# Patient Record
Sex: MALE | Race: WHITE | NOT HISPANIC OR LATINO | Employment: FULL TIME | URBAN - METROPOLITAN AREA
[De-identification: names, ages, dates, MRNs, and addresses within clinical notes are randomized per-mention and may not be internally consistent; named-entity substitution may affect disease eponyms.]

---

## 2024-07-22 ENCOUNTER — HOSPITAL ENCOUNTER (EMERGENCY)
Facility: CLINIC | Age: 19
Discharge: HOME | End: 2024-07-23
Attending: EMERGENCY MEDICINE | Admitting: EMERGENCY MEDICINE
Payer: COMMERCIAL

## 2024-07-22 VITALS
HEIGHT: 69 IN | WEIGHT: 150.57 LBS | SYSTOLIC BLOOD PRESSURE: 161 MMHG | OXYGEN SATURATION: 100 % | BODY MASS INDEX: 22.3 KG/M2 | TEMPERATURE: 99.2 F | DIASTOLIC BLOOD PRESSURE: 79 MMHG | HEART RATE: 103 BPM | RESPIRATION RATE: 18 BRPM

## 2024-07-22 DIAGNOSIS — F07.81 POST CONCUSSIVE SYNDROME: ICD-10-CM

## 2024-07-22 DIAGNOSIS — S61.012A THUMB LACERATION, LEFT, INITIAL ENCOUNTER: ICD-10-CM

## 2024-07-22 PROCEDURE — 99284 EMERGENCY DEPT VISIT MOD MDM: CPT | Mod: 25

## 2024-07-22 ASSESSMENT — COLUMBIA-SUICIDE SEVERITY RATING SCALE - C-SSRS
1. IN THE PAST MONTH, HAVE YOU WISHED YOU WERE DEAD OR WISHED YOU COULD GO TO SLEEP AND NOT WAKE UP?: NO
5. HAVE YOU STARTED TO WORK OUT OR WORKED OUT THE DETAILS OF HOW TO KILL YOURSELF? DO YOU INTEND TO CARRY OUT THIS PLAN?: NO
4. HAVE YOU HAD THESE THOUGHTS AND HAD SOME INTENTION OF ACTING ON THEM?: NO
6. HAVE YOU EVER DONE ANYTHING, STARTED TO DO ANYTHING, OR PREPARED TO DO ANYTHING TO END YOUR LIFE?: NO
3. HAVE YOU BEEN THINKING ABOUT HOW YOU MIGHT KILL YOURSELF?: NO
2. HAVE YOU ACTUALLY HAD ANY THOUGHTS OF KILLING YOURSELF IN THE PAST MONTH?: YES

## 2024-07-22 NOTE — Clinical Note
Reji Sams was seen and treated in our emergency department on 7/22/2024.  He may return to work on 07/26/2024.       If you have any questions or concerns, please don't hesitate to call.      Venu Zavaleta MD

## 2024-07-23 ENCOUNTER — APPOINTMENT (OUTPATIENT)
Dept: CT IMAGING | Facility: CLINIC | Age: 19
End: 2024-07-23
Attending: EMERGENCY MEDICINE
Payer: COMMERCIAL

## 2024-07-23 PROCEDURE — 70450 CT HEAD/BRAIN W/O DYE: CPT

## 2024-07-23 PROCEDURE — 90471 IMMUNIZATION ADMIN: CPT | Performed by: EMERGENCY MEDICINE

## 2024-07-23 PROCEDURE — 250N000011 HC RX IP 250 OP 636: Performed by: EMERGENCY MEDICINE

## 2024-07-23 PROCEDURE — 90715 TDAP VACCINE 7 YRS/> IM: CPT | Performed by: EMERGENCY MEDICINE

## 2024-07-23 RX ADMIN — CLOSTRIDIUM TETANI TOXOID ANTIGEN (FORMALDEHYDE INACTIVATED), CORYNEBACTERIUM DIPHTHERIAE TOXOID ANTIGEN (FORMALDEHYDE INACTIVATED), BORDETELLA PERTUSSIS TOXOID ANTIGEN (GLUTARALDEHYDE INACTIVATED), BORDETELLA PERTUSSIS FILAMENTOUS HEMAGGLUTININ ANTIGEN (FORMALDEHYDE INACTIVATED), BORDETELLA PERTUSSIS PERTACTIN ANTIGEN, AND BORDETELLA PERTUSSIS FIMBRIAE 2/3 ANTIGEN 0.5 ML: 5; 2; 2.5; 5; 3; 5 INJECTION, SUSPENSION INTRAMUSCULAR at 00:17

## 2024-07-23 ASSESSMENT — ACTIVITIES OF DAILY LIVING (ADL): ADLS_ACUITY_SCORE: 35
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
